# Patient Record
Sex: MALE | Race: WHITE | NOT HISPANIC OR LATINO | Employment: OTHER | URBAN - NONMETROPOLITAN AREA
[De-identification: names, ages, dates, MRNs, and addresses within clinical notes are randomized per-mention and may not be internally consistent; named-entity substitution may affect disease eponyms.]

---

## 2017-02-01 PROBLEM — R01.1 SYSTOLIC MURMUR: Status: ACTIVE | Noted: 2017-02-01

## 2017-02-01 PROBLEM — E11.9 DIABETES MELLITUS TYPE 2, INSULIN DEPENDENT (HCC): Status: ACTIVE | Noted: 2017-02-01

## 2017-02-01 PROBLEM — E78.5 HYPERLIPIDEMIA: Status: ACTIVE | Noted: 2017-02-01

## 2017-02-01 PROBLEM — J42 CHRONIC BRONCHITIS (HCC): Status: ACTIVE | Noted: 2017-02-01

## 2017-02-01 PROBLEM — Z79.4 DIABETES MELLITUS TYPE 2, INSULIN DEPENDENT (HCC): Status: ACTIVE | Noted: 2017-02-01

## 2017-02-01 PROBLEM — H53.50 COLORBLINDNESS: Status: ACTIVE | Noted: 2017-02-01

## 2017-04-14 PROBLEM — I77.819 AORTIC ECTASIA (HCC): Status: ACTIVE | Noted: 2017-04-14

## 2017-09-16 PROBLEM — I34.0 MODERATE MITRAL REGURGITATION: Status: ACTIVE | Noted: 2017-09-16

## 2017-09-16 PROBLEM — I35.1 MILD AORTIC INSUFFICIENCY: Status: ACTIVE | Noted: 2017-09-16

## 2017-09-16 PROBLEM — R01.1 SYSTOLIC MURMUR: Status: RESOLVED | Noted: 2017-02-01 | Resolved: 2017-09-16

## 2018-03-19 PROBLEM — Z85.820 HISTORY OF MELANOMA: Status: ACTIVE | Noted: 2018-03-19

## 2018-03-19 PROBLEM — Z83.719 FAMILY HISTORY OF COLONIC POLYPS: Status: ACTIVE | Noted: 2018-03-19

## 2018-07-10 ENCOUNTER — OFFICE VISIT (OUTPATIENT)
Dept: CARDIOLOGY | Facility: CLINIC | Age: 78
End: 2018-07-10
Payer: MEDICARE

## 2018-07-10 VITALS
DIASTOLIC BLOOD PRESSURE: 60 MMHG | WEIGHT: 185 LBS | SYSTOLIC BLOOD PRESSURE: 110 MMHG | BODY MASS INDEX: 25.06 KG/M2 | HEART RATE: 94 BPM | OXYGEN SATURATION: 94 % | HEIGHT: 72 IN

## 2018-07-10 DIAGNOSIS — I34.0 MODERATE MITRAL REGURGITATION: ICD-10-CM

## 2018-07-10 DIAGNOSIS — E78.49 OTHER HYPERLIPIDEMIA: ICD-10-CM

## 2018-07-10 DIAGNOSIS — I35.1 MILD AORTIC INSUFFICIENCY: ICD-10-CM

## 2018-07-10 LAB — EKG IMPRESSION: NORMAL

## 2018-07-10 PROCEDURE — 93000 ELECTROCARDIOGRAM COMPLETE: CPT | Performed by: INTERNAL MEDICINE

## 2018-07-10 PROCEDURE — 99205 OFFICE O/P NEW HI 60 MIN: CPT | Performed by: INTERNAL MEDICINE

## 2018-07-10 RX ORDER — ATORVASTATIN CALCIUM 20 MG/1
20 TABLET, FILM COATED ORAL DAILY
Qty: 90 TAB | Refills: 3 | Status: SHIPPED | OUTPATIENT
Start: 2018-07-10 | End: 2019-02-06 | Stop reason: SDUPTHER

## 2018-07-10 ASSESSMENT — ENCOUNTER SYMPTOMS
DIZZINESS: 0
ABDOMINAL PAIN: 0
LOSS OF CONSCIOUSNESS: 0
ORTHOPNEA: 0
DEPRESSION: 0
FALLS: 0
SHORTNESS OF BREATH: 0
PND: 0
PALPITATIONS: 0

## 2018-07-10 NOTE — PROGRESS NOTES
Subjective:   Ab Headley is a 78 y.o. male who was referred to cardiology clinic for evaluation of mitral regurgitation.    Pertinent history:  Hyperlipidemia  Diabetes  History of brain AVMs.  About 25 years ago patient reports undergoing a procedure to get this fixed and had a postoperative stroke.    Patient denies any PND or orthopnea.  Rare dyspnea on exertion which improves with the use of inhalers.  No chest discomfort.  No palpitations or dizziness.  No syncope.    Patient wants to know about his hyperlipidemia treatment.  He was previously on Lipitor 40 mg which he reports caused cramps in his lower extremities and also worsen his neuropathy symptoms.  He stopped taking it about a month ago.    Since undergoing the procedure 25 years ago for his brain AVMs, patient has not had repeat brain imaging.    Referring physician: Liang Virk M.D.    Past Medical History:   Diagnosis Date   • Diabetes (HCC)    • Hyperlipidemia      History reviewed. No pertinent surgical history.     History reviewed. No pertinent family history.     Social History     Social History   • Marital status:      Spouse name: N/A   • Number of children: N/A   • Years of education: N/A     Occupational History   • Not on file.     Social History Main Topics   • Smoking status: Former Smoker   • Smokeless tobacco: Never Used   • Alcohol use 0.6 oz/week     1 Shots of liquor per week   • Drug use: No   • Sexual activity: Not on file     Other Topics Concern   • Not on file     Social History Narrative   • No narrative on file     40-pack-year smoking history, quit in 2003  Rare alcohol.  No recreational drug use.    No Known Allergies  Outpatient Encounter Prescriptions as of 7/10/2018   Medication Sig Dispense Refill   • atorvastatin (LIPITOR) 20 MG Tab Take 1 Tab by mouth every day. IN THE EVENING 90 Tab 3   • ipratropium-albuterol (COMBIVENT RESPIMAT)  MCG/ACT Aero Soln Inhale 2 Puffs by mouth 4 times a day. 4 g 3  "  • lisinopril (PRINIVIL) 5 MG Tab Take 1 Tab by mouth every day. 90 Tab 3   • metformin (GLUCOPHAGE) 1000 MG tablet Take 1 Tab by mouth 2 times a day, with meals. 180 Tab 3   • aspirin EC (ECOTRIN) 81 MG Tablet Delayed Response Take 1 Tab by mouth every day. 90 Tab 3   • Cholecalciferol (VITAMIN D-3) 1000 units Cap Take 1 Capsule by mouth every day. 100 Cap 3   • glipiZIDE (GLUCOTROL) 10 MG Tab TAKE 1 TABLET BY MOUTH TWICE DAILY 180 Tab 3   • insulin NPH (HUMULIN N) 100 UNIT/ML Suspension Inject 25 Units as instructed every evening. 10 mL 3   • Insulin Syringe-Needle U-100 (BD INSULIN SYRINGE ULTRAFINE) 31G X 15/64\" 0.5 ML Misc Inject 1 Dose as instructed every evening. 90 Each 3   • glucose blood strip 1 Strip by Other route as needed. Test 1 time daily DX: E11.9  One touch Ultra 50 strips 100 Strip 3   • [DISCONTINUED] atorvastatin (LIPITOR) 40 MG Tab TAKE 1 TABLET BY MOUTH DAILY IN THE EVENING 90 Tab 3   • Lancets (FINGERSTICK) Misc 1 Each by Other route as needed. Test 1 time daily  DX: E 11.9  one touch delica lancets 33 gissell 100 Each 3     No facility-administered encounter medications on file as of 7/10/2018.      Review of Systems   Constitutional: Negative for malaise/fatigue.   Respiratory: Negative for shortness of breath.    Cardiovascular: Negative for chest pain, palpitations, orthopnea, leg swelling and PND.   Gastrointestinal: Negative for abdominal pain.   Musculoskeletal: Negative for falls.   Neurological: Negative for dizziness and loss of consciousness.   Psychiatric/Behavioral: Negative for depression.   All other systems reviewed and are negative.       Objective:   /60   Pulse 94   Ht 1.829 m (6')   Wt 83.9 kg (185 lb)   SpO2 94%   BMI 25.09 kg/m²     Physical Exam   Constitutional: He is oriented to person, place, and time. He appears well-developed and well-nourished. No distress.   HENT:   Head: Normocephalic and atraumatic.   Eyes: Conjunctivae are normal.   Neck: Normal range " of motion. Neck supple.   Cardiovascular: Normal rate and regular rhythm.  Exam reveals no gallop and no friction rub.    Murmur heard.   Systolic murmur is present with a grade of 2/6   Pulmonary/Chest: Effort normal and breath sounds normal. No respiratory distress. He has no wheezes. He has no rales.   Abdominal: Soft. He exhibits no distension. There is no tenderness.   Musculoskeletal: He exhibits no edema.   Neurological: He is alert and oriented to person, place, and time.   Skin: Skin is warm and dry. He is not diaphoretic.   Psychiatric: He has a normal mood and affect. His behavior is normal.   Nursing note and vitals reviewed.    EKG performed today was personally reviewed and showed sinus rhythm at 76 bpm with sinus arrhythmia.  Left anterior fascicular block.    Echocardiogram performed in April 2017 was reviewed and showed mild AI.  Mild to moderate MR.  Normal LV function.    Assessment:     1. Mild aortic insufficiency  EKG    BASIC METABOLIC PANEL   2. Other hyperlipidemia  LIPID PROFILE   3. Moderate mitral regurgitation         Medical Decision Making:  Today's Assessment / Status / Plan:     Concern for valvular heart disease:  Mild AI:  Mild to moderate MR:  Patient only has mild regurgitation as noted above.  Patient does not have any heart failure symptoms.  No change in management for now.  This is not a concerning issue for now.  Patient will get a repeat echocardiogram in about 2 years for reevaluation.  Should he start experiencing heart failure symptoms, he will let us know.  Chem-7 was ordered today to evaluate renal function.    Hyperlipidemia: Patient reports questionable intolerance to statins.  I suspect most of her symptoms are related to his diabetes, but the patient is hesitant to take high doses of statins.  Since he does have diabetes, he should be to be on a statin.  He is agreeable to starting 20 mg of Lipitor which I have ordered today.  I will get a repeat lipid panel in  about 4 months to reevaluate.    History of brain AVMs: Underwent a procedure about 25 years ago which resulted in postoperative stroke.  Neurologic deficits have resolved.  Patient has not had a repeat study since then.  I think patient should undergo repeat brain imaging to reevaluate for this and undergo coiling if indicated.  I will defer to Dr. Virk.  He is currently on aspirin which may need to be reevaluated based on imaging results.    Return to clinic in 1 year or earlier if needed.    Thank you for allowing me to participate in the care of this patient. Please do not hesitate to contact me with any questions.    Peggy Carrington MD  Cardiologist  [facility]      PLEASE NOTE: This dictation was created using voice recognition software.

## 2018-07-10 NOTE — LETTER
[facility] [address],   [address]nd [place] [address]  Phone: [phone]  Fax: [phone]              Dear Dr. Virk,     It was a pleasure to see your patient in Cardiology clinic. Please see my note from today attached below.  Patient informed me today that he has previously been diagnosed with brain AVMs.  I think he likely needs repeat brain imaging, however I will defer this decision to you.    Sincerely,    Peggy Carrington MD        Ab Headley  1940    Encounter Date: 7/10/2018    Peggy Carrington M.D.          PROGRESS NOTE:    Subjective:   Ab Headley is a 78 y.o. male who was referred to cardiology clinic for evaluation of mitral regurgitation.    Pertinent history:  Hyperlipidemia  Diabetes  History of brain AVMs.  About 25 years ago patient reports undergoing a procedure to get this fixed and had a postoperative stroke.    Patient denies any PND or orthopnea.  Rare dyspnea on exertion which improves with the use of inhalers.  No chest discomfort.  No palpitations or dizziness.  No syncope.    Patient wants to know about his hyperlipidemia treatment.  He was previously on Lipitor 40 mg which he reports caused cramps in his lower extremities and also worsen his neuropathy symptoms.  He stopped taking it about a month ago.    Since undergoing the procedure 25 years ago for his brain AVMs, patient has not had repeat brain imaging.    Referring physician: Liang Virk M.D.    Past Medical History:   Diagnosis Date   • Diabetes (HCC)    • Hyperlipidemia      History reviewed. No pertinent surgical history.     History reviewed. No pertinent family history.     Social History     Social History   • Marital status:      Spouse name: N/A   • Number of children: N/A   • Years of education: N/A     Occupational History   • Not on file.     Social History Main Topics   • Smoking status: Former Smoker   • Smokeless tobacco:  "Never Used   • Alcohol use 0.6 oz/week     1 Shots of liquor per week   • Drug use: No   • Sexual activity: Not on file     Other Topics Concern   • Not on file     Social History Narrative   • No narrative on file     40-pack-year smoking history, quit in 2003  Rare alcohol.  No recreational drug use.    No Known Allergies  Outpatient Encounter Prescriptions as of 7/10/2018   Medication Sig Dispense Refill   • atorvastatin (LIPITOR) 20 MG Tab Take 1 Tab by mouth every day. IN THE EVENING 90 Tab 3   • ipratropium-albuterol (COMBIVENT RESPIMAT)  MCG/ACT Aero Soln Inhale 2 Puffs by mouth 4 times a day. 4 g 3   • lisinopril (PRINIVIL) 5 MG Tab Take 1 Tab by mouth every day. 90 Tab 3   • metformin (GLUCOPHAGE) 1000 MG tablet Take 1 Tab by mouth 2 times a day, with meals. 180 Tab 3   • aspirin EC (ECOTRIN) 81 MG Tablet Delayed Response Take 1 Tab by mouth every day. 90 Tab 3   • Cholecalciferol (VITAMIN D-3) 1000 units Cap Take 1 Capsule by mouth every day. 100 Cap 3   • glipiZIDE (GLUCOTROL) 10 MG Tab TAKE 1 TABLET BY MOUTH TWICE DAILY 180 Tab 3   • insulin NPH (HUMULIN N) 100 UNIT/ML Suspension Inject 25 Units as instructed every evening. 10 mL 3   • Insulin Syringe-Needle U-100 (BD INSULIN SYRINGE ULTRAFINE) 31G X 15/64\" 0.5 ML Misc Inject 1 Dose as instructed every evening. 90 Each 3   • glucose blood strip 1 Strip by Other route as needed. Test 1 time daily DX: E11.9  One touch Ultra 50 strips 100 Strip 3   • [DISCONTINUED] atorvastatin (LIPITOR) 40 MG Tab TAKE 1 TABLET BY MOUTH DAILY IN THE EVENING 90 Tab 3   • Lancets (FINGERSTICK) Misc 1 Each by Other route as needed. Test 1 time daily  DX: E 11.9  one touch delica lancets 33 gissell 100 Each 3     No facility-administered encounter medications on file as of 7/10/2018.      Review of Systems   Constitutional: Negative for malaise/fatigue.   Respiratory: Negative for shortness of breath.    Cardiovascular: Negative for chest pain, palpitations, orthopnea, leg " swelling and PND.   Gastrointestinal: Negative for abdominal pain.   Musculoskeletal: Negative for falls.   Neurological: Negative for dizziness and loss of consciousness.   Psychiatric/Behavioral: Negative for depression.   All other systems reviewed and are negative.       Objective:   /60   Pulse 94   Ht 1.829 m (6')   Wt 83.9 kg (185 lb)   SpO2 94%   BMI 25.09 kg/m²      Physical Exam   Constitutional: He is oriented to person, place, and time. He appears well-developed and well-nourished. No distress.   HENT:   Head: Normocephalic and atraumatic.   Eyes: Conjunctivae are normal.   Neck: Normal range of motion. Neck supple.   Cardiovascular: Normal rate, regular rhythm and normal heart sounds.  Exam reveals no gallop and no friction rub.    No murmur heard.  Pulmonary/Chest: Effort normal and breath sounds normal. No respiratory distress. He has no wheezes. He has no rales.   Abdominal: Soft. He exhibits no distension. There is no tenderness.   Musculoskeletal: He exhibits no edema.   Neurological: He is alert and oriented to person, place, and time.   Skin: Skin is warm and dry. He is not diaphoretic.   Psychiatric: He has a normal mood and affect. His behavior is normal.   Nursing note and vitals reviewed.    EKG performed today was personally reviewed and showed sinus rhythm at 76 bpm with sinus arrhythmia.  Left anterior fascicular block.    Echocardiogram performed in April 2017 was reviewed and showed mild AI.  Mild to moderate MR.  Normal LV function.    Assessment:     1. Mild aortic insufficiency  EKG    BASIC METABOLIC PANEL   2. Other hyperlipidemia  LIPID PROFILE   3. Moderate mitral regurgitation         Medical Decision Making:  Today's Assessment / Status / Plan:     Concern for valvular heart disease:  Mild AI:  Mild to moderate MR:  Patient only has mild regurgitation as noted above.  Patient does not have any heart failure symptoms.  No change in management for now.  This is not a  concerning issue for now.  Patient will get a repeat echocardiogram in about 2 years for reevaluation.  Should he start experiencing heart failure symptoms, he will let us know.  Chem-7 was ordered today to evaluate renal function.    Hyperlipidemia: Patient reports questionable intolerance to statins.  I suspect most of her symptoms are related to his diabetes, but the patient is hesitant to take high doses of statins.  Since he does have diabetes, he should be to be on a statin.  He is agreeable to starting 20 mg of Lipitor which I have ordered today.  I will get a repeat lipid panel in about 4 months to reevaluate.    History of brain AVMs: Underwent a procedure about 25 years ago which resulted in postoperative stroke.  Neurologic deficits have resolved.  Patient has not had a repeat study since then.  I think patient should undergo repeat brain imaging to reevaluate for this and undergo coiling if indicated.  I will defer to Dr. Virk.  He is currently on aspirin which may need to be reevaluated based on imaging results.    Return to clinic in 1 year or earlier if needed.    Thank you for allowing me to participate in the care of this patient. Please do not hesitate to contact me with any questions.    Peggy Carrington MD  Cardiologist  [facility]      PLEASE NOTE: This dictation was created using voice recognition software.         Liang Virk M.D.  [address] [facility] [id number]  VIA In Basket

## 2018-12-18 ENCOUNTER — HOME HEALTH ADMISSION (OUTPATIENT)
Dept: HOME HEALTH SERVICES | Facility: HOME HEALTHCARE | Age: 78
End: 2018-12-18
Payer: MEDICARE

## 2019-02-19 PROBLEM — R29.898 WEAKNESS OF BOTH LOWER EXTREMITIES: Status: ACTIVE | Noted: 2019-02-19

## 2019-10-17 ENCOUNTER — OFFICE VISIT (OUTPATIENT)
Dept: CARDIOLOGY | Facility: CLINIC | Age: 79
End: 2019-10-17
Payer: MEDICARE

## 2019-10-17 VITALS
OXYGEN SATURATION: 93 % | BODY MASS INDEX: 25.19 KG/M2 | SYSTOLIC BLOOD PRESSURE: 148 MMHG | WEIGHT: 186 LBS | DIASTOLIC BLOOD PRESSURE: 70 MMHG | HEIGHT: 72 IN | HEART RATE: 80 BPM

## 2019-10-17 DIAGNOSIS — I34.0 MODERATE MITRAL REGURGITATION: ICD-10-CM

## 2019-10-17 DIAGNOSIS — R42 DIZZINESS: ICD-10-CM

## 2019-10-17 DIAGNOSIS — E78.49 OTHER HYPERLIPIDEMIA: ICD-10-CM

## 2019-10-17 DIAGNOSIS — I10 ESSENTIAL HYPERTENSION: ICD-10-CM

## 2019-10-17 DIAGNOSIS — Z79.899 ENCOUNTER FOR LONG-TERM (CURRENT) USE OF HIGH-RISK MEDICATION: ICD-10-CM

## 2019-10-17 DIAGNOSIS — I35.1 MILD AORTIC INSUFFICIENCY: ICD-10-CM

## 2019-10-17 PROCEDURE — 99215 OFFICE O/P EST HI 40 MIN: CPT | Performed by: INTERNAL MEDICINE

## 2019-10-17 ASSESSMENT — ENCOUNTER SYMPTOMS
ABDOMINAL PAIN: 0
LOSS OF CONSCIOUSNESS: 0
DEPRESSION: 0
ORTHOPNEA: 0
PALPITATIONS: 0
FALLS: 0
DIZZINESS: 0
SHORTNESS OF BREATH: 0
PND: 0

## 2019-10-17 NOTE — PROGRESS NOTES
Subjective:   Ab Headley is a 79-year-old male presenting to clinic for follow-up on mitral regurgitation.    Pertinent history:  Hyperlipidemia  Moderate mitral regurgitation  Diabetes  History of brain AVMs.  About 25 years ago patient reports undergoing a procedure to get this fixed and had a postoperative stroke.      Patient has been doing well since his last appointment.  He continues to stay active and denies any cardiac symptoms.    In regard to his mitral regurgitation, he denies any heart failure symptoms.  His blood pressures very well controlled, like today.  He does report getting occasionally dizzy when he stands up too fast.  For his hyperlipidemia is currently on Lipitor which he is tolerating well.      Past Medical History:   Diagnosis Date   • Diabetes (HCC)    • HTN (hypertension)    • Hyperlipidemia      Past Surgical History:   Procedure Laterality Date   • HIP HEMIARTHROPLASTY Right 12/15/2018    Procedure: HIP HEMIARTHROPLASTY;  Surgeon: Kaleb Hodge M.D.;  Location: SURGERY [facility];  Service: Orthopedics   • HIP ORIF Left         History reviewed. No pertinent family history.     Social History     Socioeconomic History   • Marital status:      Spouse name: Not on file   • Number of children: Not on file   • Years of education: Not on file   • Highest education level: Not on file   Occupational History   • Not on file   Social Needs   • Financial resource strain: Not on file   • Food insecurity:     Worry: Not on file     Inability: Not on file   • Transportation needs:     Medical: Not on file     Non-medical: Not on file   Tobacco Use   • Smoking status: Former Smoker   • Smokeless tobacco: Never Used   Substance and Sexual Activity   • Alcohol use: Yes     Alcohol/week: 0.6 oz     Types: 1 Shots of liquor per week   • Drug use: No   • Sexual activity: Not on file   Lifestyle   • Physical activity:     Days per week: Not on file     Minutes per session: Not on file  "  • Stress: Not on file   Relationships   • Social connections:     Talks on phone: Not on file     Gets together: Not on file     Attends Gnosticist service: Not on file     Active member of club or organization: Not on file     Attends meetings of clubs or organizations: Not on file     Relationship status: Not on file   • Intimate partner violence:     Fear of current or ex partner: Not on file     Emotionally abused: Not on file     Physically abused: Not on file     Forced sexual activity: Not on file   Other Topics Concern   • Not on file   Social History Narrative   • Not on file       Allergies   Allergen Reactions   • Fish Allergy      Outpatient Encounter Medications as of 10/17/2019   Medication Sig Dispense Refill   • insulin glargine (LANTUS) 100 UNIT/ML Solution Inject 20 Units as instructed every bedtime. 10 mL 3   • ipratropium-albuterol (COMBIVENT RESPIMAT)  MCG/ACT Aero Soln Inhale 2 Puffs by mouth 4 times a day. 4 g 3   • HUMULIN N 100 UNIT/ML Suspension INJECT 25 UNITS SUBCUTANEOUSLY  IN THE EVENING AS DIRECTED 10 mL 3   • Insulin Syringe-Needle U-100 (BD INSULIN SYRINGE ULTRAFINE) 31G X 15/64\" 0.5 ML Misc Inject 1 Dose as instructed every evening. 90 Each 3   • atorvastatin (LIPITOR) 20 MG Tab Take 1 Tab by mouth every day. IN THE EVENING 90 Tab 3   • lisinopril (PRINIVIL) 5 MG Tab Take 1 Tab by mouth every day. 90 Tab 3   • glipiZIDE (GLUCOTROL) 10 MG Tab TAKE 1 TABLET BY MOUTH TWICE DAILY 180 Tab 3   • metformin (GLUCOPHAGE) 1000 MG tablet TAKE 1 TABLET BY MOUTH TWICE DAILY WITH MEALS 180 Tab 3   • aspirin EC (ECOTRIN) 81 MG Tablet Delayed Response Take 4 Tabs by mouth 2 Times a Day. (Patient taking differently: Take 81 mg by mouth every day.) 60 Tab 0   • docusate sodium 100 MG Cap Take 100 mg by mouth 2 Times a Day. 60 Cap 0   • pantoprazole (PROTONIX) 40 MG Tablet Delayed Response Take 1 Tab by mouth every day. 30 Tab 0   • Lancets (FINGERSTICK) Misc 1 Each by Other route as needed. " Test 1 time daily  DX: E 11.9  one touch delica lancets 33 gissell 100 Each 3   • glucose blood strip Test 1 time daily DX: E11.9  One touch Ultra 100 Strip 3     No facility-administered encounter medications on file as of 10/17/2019.      Review of Systems   Constitutional: Negative for malaise/fatigue.   Respiratory: Negative for shortness of breath.    Cardiovascular: Negative for chest pain, palpitations, orthopnea, leg swelling and PND.   Gastrointestinal: Negative for abdominal pain.   Musculoskeletal: Positive for joint pain. Negative for falls.   Neurological: Negative for dizziness and loss of consciousness.   Psychiatric/Behavioral: Negative for depression.   All other systems reviewed and are negative.       Objective:   /70 (BP Location: Right arm, Patient Position: Sitting)   Pulse 80   Ht 1.829 m (6')   Wt 84.4 kg (186 lb)   SpO2 93%   BMI 25.23 kg/m²     Physical Exam   Constitutional: He is oriented to person, place, and time. He appears well-developed and well-nourished. No distress.   HENT:   Head: Normocephalic and atraumatic.   Eyes: Conjunctivae are normal.   Neck: Normal range of motion. Neck supple.   Cardiovascular: Normal rate and regular rhythm. Exam reveals no gallop and no friction rub.   Murmur heard.   Systolic murmur is present with a grade of 1/6.  Pulmonary/Chest: Effort normal and breath sounds normal. No respiratory distress. He has no wheezes. He has no rales.   Abdominal: Soft. He exhibits no distension. There is no tenderness.   Musculoskeletal: He exhibits no edema.   Neurological: He is alert and oriented to person, place, and time.   Skin: Skin is warm and dry. He is not diaphoretic.   Psychiatric: He has a normal mood and affect. His behavior is normal.   Nursing note and vitals reviewed.    Echocardiogram performed in April 2017 was reviewed and showed mild AI.  Mild to moderate MR.  Normal LV function.    Labs performed in April 2019 were reviewed and showed  normal creatinine.  LDL 52.    Assessment:     1. Moderate mitral regurgitation  EC-ECHOCARDIOGRAM COMPLETE W/O CONT   2. Mild aortic insufficiency  EC-ECHOCARDIOGRAM COMPLETE W/O CONT   3. Other hyperlipidemia     4. Essential hypertension     5. Encounter for long-term (current) use of high-risk medication     6. Dizziness         Medical Decision Making:  Today's Assessment / Status / Plan:     Mild AI:  Mild to moderate MR:  No significant change on exam noted.  Repeat echocardiogram will be ordered today to reevaluate valvular pathology.    Dizziness: Likely secondary to orthostasis based on his history.  Patient has been advised to consider performing leg exercises before standing up and remember to stand up slowly.  If his symptoms worsen, he should let us know.    Hypertension: Blood pressure is at goal.  Continue lisinopril at current dose.  His renal function has been normal.    Hyperlipidemia: LDL at goal.  Continue Lipitor at current dose.    Return to clinic in 1 year or earlier if needed.    Thank you for allowing me to participate in the care of this patient. Please do not hesitate to contact me with any questions.    Peggy Carrington MD  Cardiologist  [facility]      PLEASE NOTE: This dictation was created using voice recognition software.

## 2019-10-17 NOTE — LETTER
[facility] [address],   [address] [place] [address]  Phone: [phone]  Fax: [phone]              Ab Headley  1940    Encounter Date: 10/17/2019    Peggy Carrington M.D.          PROGRESS NOTE:    Subjective:   Ab Headley is a 79-year-old male presenting to clinic for follow-up on mitral regurgitation.    Pertinent history:  Hyperlipidemia  Moderate mitral regurgitation  Diabetes  History of brain AVMs.  About 25 years ago patient reports undergoing a procedure to get this fixed and had a postoperative stroke.      Patient has been doing well since his last appointment.  He continues to stay active and denies any cardiac symptoms.    In regard to his mitral regurgitation, he denies any heart failure symptoms.  His blood pressures very well controlled, like today.  He does report getting occasionally dizzy when he stands up too fast.  For his hyperlipidemia is currently on Lipitor which he is tolerating well.      Past Medical History:   Diagnosis Date   • Diabetes (HCC)    • HTN (hypertension)    • Hyperlipidemia      Past Surgical History:   Procedure Laterality Date   • HIP HEMIARTHROPLASTY Right 12/15/2018    Procedure: HIP HEMIARTHROPLASTY;  Surgeon: Kaleb Hodge M.D.;  Location: SURGERY [facility];  Service: Orthopedics   • HIP ORIF Left         History reviewed. No pertinent family history.     Social History     Socioeconomic History   • Marital status:      Spouse name: Not on file   • Number of children: Not on file   • Years of education: Not on file   • Highest education level: Not on file   Occupational History   • Not on file   Social Needs   • Financial resource strain: Not on file   • Food insecurity:     Worry: Not on file     Inability: Not on file   • Transportation needs:     Medical: Not on file     Non-medical: Not on file   Tobacco Use   • Smoking status: Former Smoker   • Smokeless tobacco:  "Never Used   Substance and Sexual Activity   • Alcohol use: Yes     Alcohol/week: 0.6 oz     Types: 1 Shots of liquor per week   • Drug use: No   • Sexual activity: Not on file   Lifestyle   • Physical activity:     Days per week: Not on file     Minutes per session: Not on file   • Stress: Not on file   Relationships   • Social connections:     Talks on phone: Not on file     Gets together: Not on file     Attends [employer] service: Not on file     Active member of club or organization: Not on file     Attends meetings of clubs or organizations: Not on file     Relationship status: Not on file   • Intimate partner violence:     Fear of current or ex partner: Not on file     Emotionally abused: Not on file     Physically abused: Not on file     Forced sexual activity: Not on file   Other Topics Concern   • Not on file   Social History Narrative   • Not on file       Allergies   Allergen Reactions   • Fish Allergy      Outpatient Encounter Medications as of 10/17/2019   Medication Sig Dispense Refill   • insulin glargine (LANTUS) 100 UNIT/ML Solution Inject 20 Units as instructed every bedtime. 10 mL 3   • ipratropium-albuterol (COMBIVENT RESPIMAT)  MCG/ACT Aero Soln Inhale 2 Puffs by mouth 4 times a day. 4 g 3   • HUMULIN N 100 UNIT/ML Suspension INJECT 25 UNITS SUBCUTANEOUSLY  IN THE EVENING AS DIRECTED 10 mL 3   • Insulin Syringe-Needle U-100 (BD INSULIN SYRINGE ULTRAFINE) 31G X 15/64\" 0.5 ML Misc Inject 1 Dose as instructed every evening. 90 Each 3   • atorvastatin (LIPITOR) 20 MG Tab Take 1 Tab by mouth every day. IN THE EVENING 90 Tab 3   • lisinopril (PRINIVIL) 5 MG Tab Take 1 Tab by mouth every day. 90 Tab 3   • glipiZIDE (GLUCOTROL) 10 MG Tab TAKE 1 TABLET BY MOUTH TWICE DAILY 180 Tab 3   • metformin (GLUCOPHAGE) 1000 MG tablet TAKE 1 TABLET BY MOUTH TWICE DAILY WITH MEALS 180 Tab 3   • aspirin EC (ECOTRIN) 81 MG Tablet Delayed Response Take 4 Tabs by mouth 2 Times a Day. (Patient taking differently: " Take 81 mg by mouth every day.) 60 Tab 0   • docusate sodium 100 MG Cap Take 100 mg by mouth 2 Times a Day. 60 Cap 0   • pantoprazole (PROTONIX) 40 MG Tablet Delayed Response Take 1 Tab by mouth every day. 30 Tab 0   • Lancets (FINGERSTICK) Misc 1 Each by Other route as needed. Test 1 time daily  DX: E 11.9  one touch delica lancets 33 gissell 100 Each 3   • glucose blood strip Test 1 time daily DX: E11.9  One touch Ultra 100 Strip 3     No facility-administered encounter medications on file as of 10/17/2019.      Review of Systems   Constitutional: Negative for malaise/fatigue.   Respiratory: Negative for shortness of breath.    Cardiovascular: Negative for chest pain, palpitations, orthopnea, leg swelling and PND.   Gastrointestinal: Negative for abdominal pain.   Musculoskeletal: Positive for joint pain. Negative for falls.   Neurological: Negative for dizziness and loss of consciousness.   Psychiatric/Behavioral: Negative for depression.   All other systems reviewed and are negative.       Objective:   /70 (BP Location: Right arm, Patient Position: Sitting)   Pulse 80   Ht 1.829 m (6')   Wt 84.4 kg (186 lb)   SpO2 93%   BMI 25.23 kg/m²      Physical Exam   Constitutional: He is oriented to person, place, and time. He appears well-developed and well-nourished. No distress.   HENT:   Head: Normocephalic and atraumatic.   Eyes: Conjunctivae are normal.   Neck: Normal range of motion. Neck supple.   Cardiovascular: Normal rate and regular rhythm. Exam reveals no gallop and no friction rub.   Murmur heard.   Systolic murmur is present with a grade of 1/6.  Pulmonary/Chest: Effort normal and breath sounds normal. No respiratory distress. He has no wheezes. He has no rales.   Abdominal: Soft. He exhibits no distension. There is no tenderness.   Musculoskeletal: He exhibits no edema.   Neurological: He is alert and oriented to person, place, and time.   Skin: Skin is warm and dry. He is not diaphoretic.      Psychiatric: He has a normal mood and affect. His behavior is normal.   Nursing note and vitals reviewed.    Echocardiogram performed in April 2017 was reviewed and showed mild AI.  Mild to moderate MR.  Normal LV function.    Labs performed in April 2019 were reviewed and showed normal creatinine.  LDL 52.    Assessment:     1. Moderate mitral regurgitation  EC-ECHOCARDIOGRAM COMPLETE W/O CONT   2. Mild aortic insufficiency  EC-ECHOCARDIOGRAM COMPLETE W/O CONT   3. Other hyperlipidemia     4. Essential hypertension     5. Encounter for long-term (current) use of high-risk medication     6. Dizziness         Medical Decision Making:  Today's Assessment / Status / Plan:     Mild AI:  Mild to moderate MR:  No significant change on exam noted.  Repeat echocardiogram will be ordered today to reevaluate valvular pathology.    Dizziness: Likely secondary to orthostasis based on his history.  Patient has been advised to consider performing leg exercises before standing up and remember to stand up slowly.  If his symptoms worsen, he should let us know.    Hypertension: Blood pressure is at goal.  Continue lisinopril at current dose.  His renal function has been normal.    Hyperlipidemia: LDL at goal.  Continue Lipitor at current dose.    Return to clinic in 1 year or earlier if needed.    Thank you for allowing me to participate in the care of this patient. Please do not hesitate to contact me with any questions.    Peggy Carrington MD  Cardiologist  [facility]      PLEASE NOTE: This dictation was created using voice recognition software.         Liang Virk M.D.  [address] [facility] [address]  VIA In Basket

## 2019-11-01 ENCOUNTER — TELEPHONE (OUTPATIENT)
Dept: CARDIOLOGY | Facility: MEDICAL CENTER | Age: 79
End: 2019-11-01

## 2019-11-01 NOTE — TELEPHONE ENCOUNTER
Result Notes for EC-ECHOCARDIOGRAM COMPLETE W/O CONT     Notes recorded by Peggy Carrington M.D. on 10/30/2019 at 11:49 AM PDT  Reviewed echocardiogram. No major changes from last study.   No change in management at this time.   Thank you   AA         [email or web address] message sent to pt.